# Patient Record
Sex: MALE | Race: WHITE | NOT HISPANIC OR LATINO | URBAN - METROPOLITAN AREA
[De-identification: names, ages, dates, MRNs, and addresses within clinical notes are randomized per-mention and may not be internally consistent; named-entity substitution may affect disease eponyms.]

---

## 2022-12-24 ENCOUNTER — EMERGENCY (EMERGENCY)
Facility: HOSPITAL | Age: 52
LOS: 1 days | Discharge: HOME | End: 2022-12-24
Admitting: EMERGENCY MEDICINE
Payer: COMMERCIAL

## 2022-12-24 VITALS
TEMPERATURE: 98 F | RESPIRATION RATE: 16 BRPM | SYSTOLIC BLOOD PRESSURE: 185 MMHG | WEIGHT: 169.98 LBS | DIASTOLIC BLOOD PRESSURE: 98 MMHG | HEIGHT: 70 IN | OXYGEN SATURATION: 95 % | HEART RATE: 90 BPM

## 2022-12-24 VITALS
HEART RATE: 82 BPM | RESPIRATION RATE: 14 BRPM | TEMPERATURE: 98 F | OXYGEN SATURATION: 97 % | SYSTOLIC BLOOD PRESSURE: 167 MMHG | DIASTOLIC BLOOD PRESSURE: 82 MMHG

## 2022-12-24 PROCEDURE — 70486 CT MAXILLOFACIAL W/O DYE: CPT | Mod: 26

## 2022-12-24 PROCEDURE — 70450 CT HEAD/BRAIN W/O DYE: CPT | Mod: 26

## 2022-12-24 PROCEDURE — 12013 RPR F/E/E/N/L/M 2.6-5.0 CM: CPT

## 2022-12-24 PROCEDURE — 73110 X-RAY EXAM OF WRIST: CPT | Mod: 26,50,LT,RT

## 2022-12-24 PROCEDURE — 99284 EMERGENCY DEPT VISIT MOD MDM: CPT | Mod: 25

## 2022-12-24 PROCEDURE — 73110 X-RAY EXAM OF WRIST: CPT | Mod: 26,50

## 2022-12-24 RX ADMIN — Medication 1 TABLET(S): at 06:27

## 2022-12-24 NOTE — ED PROVIDER NOTE - PATIENT PORTAL LINK FT
You can access the FollowMyHealth Patient Portal offered by [facility] by registering at the following website: [email or web address]. By joining [facility]’s FollowMyHealth portal, you will also be able to view your health information using other applications (apps) compatible with our system.

## 2022-12-24 NOTE — ED PROVIDER NOTE - PHYSICAL EXAMINATION
3 cm lac above medial aspect on right eyebrow.   Abrasions on face -small   abrasions to the dorsal aspects of b/l wrists and hands/fingers. 3.5  cm lac above medial aspect on right eyebrow.   Abrasions on face -small   abrasions to the dorsal aspects of b/l wrists and hands/fingers.

## 2022-12-24 NOTE — ED ADULT NURSE NOTE - NSIMPLEMENTINTERV_GEN_ALL_ED
Implemented All Fall Risk Interventions:  [place] to call system. Call bell, personal items and telephone within reach. Instruct patient to call for assistance. Room bathroom lighting operational. Non-slip footwear when patient is off stretcher. Physically safe environment: no spills, clutter or unnecessary equipment. Stretcher in lowest position, wheels locked, appropriate side rails in place. Provide visual cue, wrist band, yellow gown, etc. Monitor gait and stability. Monitor for mental status changes and reorient to person, place, and time. Review medications for side effects contributing to fall risk. Reinforce activity limits and safety measures with patient and family.

## 2022-12-24 NOTE — ED PROVIDER NOTE - ENMT, MLM
yes
Airway patent, Nasal mucosa clear. Mouth with normal mucosa. Throat has no vesicles, no oropharyngeal exudates and uvula is midline.

## 2022-12-24 NOTE — ED PROVIDER NOTE - OBJECTIVE STATEMENT
53 y/o male denies pmhx now here s/p fall while walking [place] from the bar. Patient states he was walking with a friend and tripped over an unknown object and fell hitting hit face and wrists. Denies fever, chills, abdominal pain, change in bowel function, flank pain, rash, HA, dizziness, SOB, CP, palpitations, diaphoresis, cough, and malaise. Patient walked home and then called EMS. Endorses ETOH use

## 2022-12-24 NOTE — ED ADULT NURSE NOTE - OBJECTIVE STATEMENT
Patient presents to ED s/p fall. + head trauma, - LOC, - ac therapy. Patient has + laceration to the face, abrasions noted to b/l palms. PERRL. Pt has + equal bilateral sensation and movement in b/l extremities. Patient endorses etoh use. Denies f/c/n/v/d, cp, sob, cough. Pt aox4, MAEx4, spont unlabored breathing on ra.

## 2022-12-24 NOTE — ED PROVIDER NOTE - CLINICAL SUMMARY MEDICAL DECISION MAKING FREE TEXT BOX
Patient here s/p fall in the street s/p walking home from bar.   Patient with lac above right eyebow on medial aspect  abrasions on face and wrist    Plan: CT head and face with wrist XRs Patient here s/p fall in the street s/p walking home from bar.   Patient with lac above right eyebow on medial aspect  abrasions on face and wrist    Plan: CT head and face with wrist XRs  lac repair

## 2022-12-24 NOTE — ED ADULT TRIAGE NOTE - CHIEF COMPLAINT QUOTE
BIBEMS from home for fall. Denies LOC. Admits to ETOH. Tdap not UTD. Laceration to forehead, abrasion to b/l hands.

## 2022-12-24 NOTE — ED PROVIDER NOTE - CARE PROVIDER_API CALL
Marychuy Araiza)  Plastic Surgery; Surgery  [address]  [address]  [place] [address]  Phone: [phone]  Fax: [phone]  Follow Up Time: Urgent

## 2022-12-27 DIAGNOSIS — S60.512A ABRASION OF LEFT HAND, INITIAL ENCOUNTER: ICD-10-CM

## 2022-12-27 DIAGNOSIS — S60.511A ABRASION OF RIGHT HAND, INITIAL ENCOUNTER: ICD-10-CM

## 2022-12-27 DIAGNOSIS — R51.9 HEADACHE, UNSPECIFIED: ICD-10-CM
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
## 2022-12-27 DIAGNOSIS — S60.812A ABRASION OF LEFT WRIST, INITIAL ENCOUNTER: ICD-10-CM
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
## 2022-12-27 DIAGNOSIS — S01.111A LACERATION WITHOUT FOREIGN BODY OF RIGHT EYELID AND PERIOCULAR AREA, INITIAL ENCOUNTER: ICD-10-CM
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
## 2022-12-27 DIAGNOSIS — S60.811A ABRASION OF RIGHT WRIST, INITIAL ENCOUNTER: ICD-10-CM
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
